# Patient Record
(demographics unavailable — no encounter records)

---

## 2019-01-30 NOTE — NUR
Patient's parents  do not wish to proceed with medical care recommended by dr. LEIGH . dR. LEIGH RECCOMMENDATION WAS TO TRANSFER TO [facility] FOR FURTHER 
TREATMENT.  Patient'S PARENTS given information related to possible 
complications, up to and including death, which could occur as a result of 
leaving hospital at this time.  Patient's PARENTS verbalizes understanding of 
risks involved leaving against medical advice.  Patient has signed AMA form.

PARENTS STATED THEY WERE GOING TO TAKE THE PT TO [facility].

## 2019-01-30 NOTE — NUR
CONTACTED [facility] TO VARIFY THAT PATIENT ARRIVED. DOING A FOLLOW UP 
SAFETY WELLFARE CHECK TO INSURE PARENTS TOOK PT TO THEIR HOSPITAL OF CHOICE. 
JASBIR HUBER STATED THEY HAD ARRIVED AND CHECKED IN.

## 2019-01-30 NOTE — NUR
PATIENT PRESENTED ER WITH C/O COUGH X 5 DAYS. PT MOM STATED SHE TOOK PT TO 
[facility] YESTERDAY AND WAS DIAGNOSED WITH RSV. PT WAS GIVEN TYLENOL 
MEDICATION. PT MOM FEELS PT HAS NO RELIEF. PT IS A/ AND APPROPRIATE FOR AGE. PT 
LUNG SOUNDS WHEEZING BILATERAL/THROUGHOUT. PT TEMP IS 99.4 RECTAL. PT MOM 
DENIES N/V/D. PT HAS A RASH ON HIS BUTTOCKS/RECTUM. PT MOM IS PUTTING DESITIN 
FOR RELIEF. ; PATIENT PAIN LEVEL IS 4/10 AT THIS TIME USING FLACC SCALE; VSS; 
PATIENT POSITIONED FOR COMFORT; HOB ELEVATED; BEDRAILS UP X2; BED DOWN. ER MD 
MADE AWARE OF PT STATUS.

## 2019-01-30 NOTE — NUR
-------------------------------------------------------------------------------

            *** Note undone in EDM - 01/30/19 at 0702 by [email or web address] ***            

-------------------------------------------------------------------------------

CONTACTED [facility] TO VARIFY THAT PATIENT ARRIVED. JASBIR HUBER STATED 
THEY HAD ARRIVED AND CHECKED IN.

## 2019-01-30 NOTE — NUR
PT WAS PUT ON O2 2L DUE TO SATURATION DROPPING TO 88 PERCENT. PT TOLERATED 
WELL. PT SATURATING AT 99 PERCENT ON 2 L. ER MD MADE AWARE

## 2019-01-30 NOTE — NUR
PT PARENTS WILL ALLOW XRAY AFTER EDUCATING THEM REGARDING THE IMPORTANCE OF 
THIS PROCEDURE AND PER MD REQUEST. ER MD MADE AWARE.

## 2019-01-30 NOTE — NUR
UNABLE TO GET AN IV STARTED FOR THE INFANT. LABS WERE DRAWN. PT TOLERATED WELL. 
PT IS USING ACCESSORY MUSCLE TO BREATH. PT IS WHEEZING AND O2 SAT IS AT 94 
PERCENT ON ROOM AIR. PARENTS ARE UNSURE ABOUT HAVING THE PT TRANSFERRED. PT 
FAMILY WAS EDUCATED REGARDING THE PATIENTS CARE. ER MD MADE AWARE.

## 2019-01-30 NOTE — NUR
PARENTS REFUSED TO ALLOW THE PT TO BE TRANSFERED TO [facility] DUE 
TO THEIR PERSONAL OPINIONS REGARDING THE HOSPITAL. ER MD MADE AWARE OF STATUS.

## 2019-09-04 NOTE — NUR
BIB MOTHER WITH COUGH X2 WKS, PER MOTHER PT'S FACE TURNS RED WHEN COUGHING. PT 
PLAYFUL NO ACCESSORY MUSCLE USE. NO MED HX.

## 2019-09-04 NOTE — NUR
Patient discharged with v/s stable. Written and verbal after care instructions 
given and explained to patient's mother. Patient's mother verbalized 
understanding. Carriedby parent. All questions addressed prior to discharge. 
Advised to follow up with PMD.